# Patient Record
Sex: MALE | Race: WHITE | NOT HISPANIC OR LATINO | RURAL
[De-identification: names, ages, dates, MRNs, and addresses within clinical notes are randomized per-mention and may not be internally consistent; named-entity substitution may affect disease eponyms.]

---

## 2018-11-05 ENCOUNTER — OFFICE VISIT (OUTPATIENT)
Dept: FAMILY MEDICINE CLINIC | Facility: CLINIC | Age: 53
End: 2018-11-05

## 2018-11-05 VITALS
HEIGHT: 69 IN | TEMPERATURE: 97.8 F | SYSTOLIC BLOOD PRESSURE: 122 MMHG | WEIGHT: 229.5 LBS | BODY MASS INDEX: 33.99 KG/M2 | DIASTOLIC BLOOD PRESSURE: 80 MMHG | HEART RATE: 80 BPM | OXYGEN SATURATION: 96 %

## 2018-11-05 DIAGNOSIS — I10 ESSENTIAL HYPERTENSION: ICD-10-CM

## 2018-11-05 DIAGNOSIS — J01.00 ACUTE NON-RECURRENT MAXILLARY SINUSITIS: Primary | ICD-10-CM

## 2018-11-05 PROCEDURE — 99214 OFFICE O/P EST MOD 30 MIN: CPT | Performed by: FAMILY MEDICINE

## 2018-11-05 RX ORDER — LISINOPRIL 10 MG/1
10 TABLET ORAL DAILY
COMMUNITY
End: 2019-12-11

## 2018-11-05 RX ORDER — CEFUROXIME AXETIL 500 MG/1
500 TABLET ORAL 2 TIMES DAILY
Qty: 20 TABLET | Refills: 0 | Status: SHIPPED | OUTPATIENT
Start: 2018-11-05 | End: 2019-12-11

## 2018-11-05 RX ORDER — CEFUROXIME AXETIL 500 MG/1
500 TABLET ORAL 2 TIMES DAILY
COMMUNITY
End: 2018-11-05 | Stop reason: SDUPTHER

## 2018-11-05 RX ORDER — PROMETHAZINE HYDROCHLORIDE AND CODEINE PHOSPHATE 6.25; 1 MG/5ML; MG/5ML
5 SOLUTION ORAL EVERY 4 HOURS PRN
Qty: 240 ML | Refills: 1 | Status: SHIPPED | OUTPATIENT
Start: 2018-11-05 | End: 2019-12-11

## 2018-11-05 RX ORDER — METOPROLOL TARTRATE 50 MG/1
50 TABLET, FILM COATED ORAL DAILY
COMMUNITY
End: 2018-11-20 | Stop reason: SDUPTHER

## 2018-11-05 NOTE — PROGRESS NOTES
Subjective   Jeremy Lopez is a 53 y.o. male here today for sinus pain & pressure and nasal drainage.     Sinus Problem   This is a new problem. The current episode started in the past 7 days. The problem has been waxing and waning since onset. There has been no fever. His pain is at a severity of 6/10. The pain is moderate. Associated symptoms include congestion, coughing, headaches, shortness of breath, sinus pressure, sneezing and a sore throat. Pertinent negatives include no chills, ear pain or neck pain. Past treatments include acetaminophen and antibiotics. The treatment provided mild relief.    Had some Ceftin left from August Rx - began those 2 days ago.    The following portions of the patient's history were reviewed and updated as appropriate: history reviewed: Social history , Past Medical History, Allergies, Current Medications, Active Problem List and Health Maintenance.    Review of Systems   Constitutional: Negative for chills, fatigue and fever.   HENT: Positive for congestion, rhinorrhea, sinus pressure, sneezing and sore throat. Negative for ear pain and trouble swallowing.    Respiratory: Positive for cough and shortness of breath.    Cardiovascular: Negative for chest pain.   Musculoskeletal: Negative for neck pain.   Skin: Negative for rash.   Neurological: Positive for headache. Negative for syncope.       Objective   Physical Exam   Constitutional: He is oriented to person, place, and time. He appears well-developed and well-nourished.   HENT:   Head: Normocephalic and atraumatic.   Nose: Mucosal edema, rhinorrhea and congestion present. Right sinus exhibits maxillary sinus tenderness. Left sinus exhibits maxillary sinus tenderness.   Mouth/Throat: Oropharynx is clear and moist. No oropharyngeal exudate.   Eyes: Conjunctivae and EOM are normal.   Neck: Normal range of motion. Neck supple.   Cardiovascular: Normal rate, regular rhythm and normal heart sounds.    Pulmonary/Chest: Effort  normal. No respiratory distress. Wheezes: bliateral fine - baseline.   Abdominal: Soft. There is no tenderness.   Musculoskeletal: Normal range of motion.   Neurological: He is alert and oriented to person, place, and time.   Skin: Skin is warm and dry. Capillary refill takes less than 2 seconds.   Psychiatric: He has a normal mood and affect.   Nursing note and vitals reviewed.      Procedures    Assessment/Plan   Problem List Items Addressed This Visit     Acute non-recurrent maxillary sinusitis - Primary    Essential hypertension    Relevant Medications    lisinopril (PRINIVIL,ZESTRIL) 10 MG tablet    metoprolol tartrate (LOPRESSOR) 50 MG tablet      Continue Ceftin - Rx renewed. Continue congestion meds. Other treatments discussed.

## 2018-11-05 NOTE — PATIENT INSTRUCTIONS

## 2018-11-20 DIAGNOSIS — I10 ESSENTIAL HYPERTENSION: Primary | ICD-10-CM

## 2018-11-20 RX ORDER — METOPROLOL TARTRATE 50 MG/1
50 TABLET, FILM COATED ORAL DAILY
Qty: 30 TABLET | Refills: 5 | Status: SHIPPED | OUTPATIENT
Start: 2018-11-20 | End: 2019-05-28 | Stop reason: SDUPTHER

## 2019-01-23 RX ORDER — LISINOPRIL 40 MG/1
TABLET ORAL
Qty: 90 TABLET | Refills: 3 | Status: SHIPPED | OUTPATIENT
Start: 2019-01-23 | End: 2019-12-11

## 2019-02-06 ENCOUNTER — CLINICAL SUPPORT (OUTPATIENT)
Dept: FAMILY MEDICINE CLINIC | Facility: CLINIC | Age: 54
End: 2019-02-06

## 2019-05-28 DIAGNOSIS — I10 ESSENTIAL HYPERTENSION: ICD-10-CM

## 2019-05-28 RX ORDER — METOPROLOL SUCCINATE 50 MG/1
TABLET, EXTENDED RELEASE ORAL
Qty: 30 TABLET | Refills: 5 | Status: SHIPPED | OUTPATIENT
Start: 2019-05-28 | End: 2019-11-26 | Stop reason: SDUPTHER

## 2019-11-26 DIAGNOSIS — I10 ESSENTIAL HYPERTENSION: ICD-10-CM

## 2019-11-26 RX ORDER — METOPROLOL SUCCINATE 50 MG/1
TABLET, EXTENDED RELEASE ORAL
Qty: 30 TABLET | Refills: 0 | Status: SHIPPED | OUTPATIENT
Start: 2019-11-26 | End: 2019-12-11 | Stop reason: SDUPTHER

## 2019-12-06 ENCOUNTER — CLINICAL SUPPORT (OUTPATIENT)
Dept: FAMILY MEDICINE CLINIC | Facility: CLINIC | Age: 54
End: 2019-12-06

## 2019-12-06 DIAGNOSIS — Z00.00 ANNUAL PHYSICAL EXAM: Primary | ICD-10-CM

## 2019-12-06 DIAGNOSIS — I10 ESSENTIAL HYPERTENSION: ICD-10-CM

## 2019-12-06 DIAGNOSIS — Z12.5 SPECIAL SCREENING FOR MALIGNANT NEOPLASM OF PROSTATE: ICD-10-CM

## 2019-12-07 LAB
ALBUMIN SERPL-MCNC: 4.8 G/DL (ref 3.5–5.5)
ALBUMIN/GLOB SERPL: 1.7 {RATIO} (ref 1.2–2.2)
ALP SERPL-CCNC: 64 IU/L (ref 39–117)
ALT SERPL-CCNC: 27 IU/L (ref 0–44)
AST SERPL-CCNC: 27 IU/L (ref 0–40)
BASOPHILS # BLD AUTO: 0 X10E3/UL (ref 0–0.2)
BASOPHILS NFR BLD AUTO: 0 %
BILIRUB SERPL-MCNC: 0.3 MG/DL (ref 0–1.2)
BUN SERPL-MCNC: 11 MG/DL (ref 6–24)
BUN/CREAT SERPL: 17 (ref 9–20)
CALCIUM SERPL-MCNC: 10 MG/DL (ref 8.7–10.2)
CHLORIDE SERPL-SCNC: 98 MMOL/L (ref 96–106)
CHOLEST SERPL-MCNC: 228 MG/DL (ref 100–199)
CO2 SERPL-SCNC: 22 MMOL/L (ref 20–29)
CREAT SERPL-MCNC: 0.63 MG/DL (ref 0.76–1.27)
EOSINOPHIL # BLD AUTO: 0.2 X10E3/UL (ref 0–0.4)
EOSINOPHIL NFR BLD AUTO: 2 %
ERYTHROCYTE [DISTWIDTH] IN BLOOD BY AUTOMATED COUNT: 12.7 % (ref 12.3–15.4)
GLOBULIN SER CALC-MCNC: 2.9 G/DL (ref 1.5–4.5)
GLUCOSE SERPL-MCNC: 114 MG/DL (ref 65–99)
HCT VFR BLD AUTO: 46.5 % (ref 37.5–51)
HDLC SERPL-MCNC: 46 MG/DL
HGB BLD-MCNC: 15.6 G/DL (ref 13–17.7)
IMM GRANULOCYTES # BLD AUTO: 0 X10E3/UL (ref 0–0.1)
IMM GRANULOCYTES NFR BLD AUTO: 0 %
LDLC SERPL CALC-MCNC: 146 MG/DL (ref 0–99)
LDLC/HDLC SERPL: 3.2 RATIO (ref 0–3.6)
LYMPHOCYTES # BLD AUTO: 2.4 X10E3/UL (ref 0.7–3.1)
LYMPHOCYTES NFR BLD AUTO: 32 %
MCH RBC QN AUTO: 33 PG (ref 26.6–33)
MCHC RBC AUTO-ENTMCNC: 33.5 G/DL (ref 31.5–35.7)
MCV RBC AUTO: 98 FL (ref 79–97)
MONOCYTES # BLD AUTO: 0.5 X10E3/UL (ref 0.1–0.9)
MONOCYTES NFR BLD AUTO: 7 %
NEUTROPHILS # BLD AUTO: 4.4 X10E3/UL (ref 1.4–7)
NEUTROPHILS NFR BLD AUTO: 59 %
PLATELET # BLD AUTO: 298 X10E3/UL (ref 150–450)
POTASSIUM SERPL-SCNC: 5 MMOL/L (ref 3.5–5.2)
PROT SERPL-MCNC: 7.7 G/DL (ref 6–8.5)
PSA SERPL-MCNC: 0.5 NG/ML (ref 0–4)
RBC # BLD AUTO: 4.73 X10E6/UL (ref 4.14–5.8)
SODIUM SERPL-SCNC: 140 MMOL/L (ref 134–144)
T4 SERPL-MCNC: 7.8 UG/DL (ref 4.5–12)
TRIGL SERPL-MCNC: 178 MG/DL (ref 0–149)
TSH SERPL DL<=0.005 MIU/L-ACNC: 1.51 UIU/ML (ref 0.45–4.5)
VLDLC SERPL CALC-MCNC: 36 MG/DL (ref 5–40)
WBC # BLD AUTO: 7.6 X10E3/UL (ref 3.4–10.8)

## 2019-12-11 ENCOUNTER — OFFICE VISIT (OUTPATIENT)
Dept: FAMILY MEDICINE CLINIC | Facility: CLINIC | Age: 54
End: 2019-12-11

## 2019-12-11 VITALS
OXYGEN SATURATION: 98 % | HEART RATE: 98 BPM | WEIGHT: 230 LBS | HEIGHT: 69 IN | TEMPERATURE: 98.2 F | BODY MASS INDEX: 34.07 KG/M2 | DIASTOLIC BLOOD PRESSURE: 74 MMHG | SYSTOLIC BLOOD PRESSURE: 118 MMHG | RESPIRATION RATE: 20 BRPM

## 2019-12-11 DIAGNOSIS — Z00.00 ANNUAL PHYSICAL EXAM: Primary | ICD-10-CM

## 2019-12-11 DIAGNOSIS — J43.1 PANLOBULAR EMPHYSEMA (HCC): ICD-10-CM

## 2019-12-11 DIAGNOSIS — E78.2 MIXED HYPERLIPIDEMIA: ICD-10-CM

## 2019-12-11 DIAGNOSIS — I10 ESSENTIAL HYPERTENSION: ICD-10-CM

## 2019-12-11 PROCEDURE — 99396 PREV VISIT EST AGE 40-64: CPT | Performed by: FAMILY MEDICINE

## 2019-12-11 RX ORDER — LISINOPRIL 10 MG/1
10 TABLET ORAL DAILY
Qty: 90 TABLET | Refills: 3 | Status: SHIPPED | OUTPATIENT
Start: 2019-12-11 | End: 2020-03-11 | Stop reason: DRUGHIGH

## 2019-12-11 RX ORDER — METOPROLOL SUCCINATE 50 MG/1
50 TABLET, EXTENDED RELEASE ORAL DAILY
Qty: 90 TABLET | Refills: 3 | Status: SHIPPED | OUTPATIENT
Start: 2019-12-11 | End: 2020-12-22 | Stop reason: SDUPTHER

## 2019-12-11 RX ORDER — LISINOPRIL 40 MG/1
40 TABLET ORAL DAILY
Qty: 90 TABLET | Refills: 3 | Status: SHIPPED | OUTPATIENT
Start: 2019-12-11 | End: 2020-12-22 | Stop reason: SDUPTHER

## 2019-12-11 NOTE — PATIENT INSTRUCTIONS
Cholesterol  Cholesterol is a white, waxy, fat-like substance that is needed by the human body in small amounts. The liver makes all the cholesterol we need. Cholesterol is carried from the liver by the blood through the blood vessels. Deposits of cholesterol (plaques) may build up on blood vessel (artery) walls. Plaques make the arteries narrower and stiffer. Cholesterol plaques increase the risk for heart attack and stroke.  You cannot feel your cholesterol level even if it is very high. The only way to know that it is high is to have a blood test. Once you know your cholesterol levels, you should keep a record of the test results. Work with your health care provider to keep your levels in the desired range.  What do the results mean?  · Total cholesterol is a rough measure of all the cholesterol in your blood.  · LDL (low-density lipoprotein) is the “bad” cholesterol. This is the type that causes plaque to build up on the artery walls. You want this level to be low.  · HDL (high-density lipoprotein) is the “good” cholesterol because it cleans the arteries and carries the LDL away. You want this level to be high.  · Triglycerides are fat that the body can either burn for energy or store. High levels are closely linked to heart disease.  What are the desired levels of cholesterol?  · Total cholesterol below 200.  · LDL below 100 for people who are at risk, below 70 for people at very high risk.  · HDL above 40 is good. A level of 60 or higher is considered to be protective against heart disease.  · Triglycerides below 150.  How can I lower my cholesterol?  Diet  Follow your diet program as told by your health care provider.  · Choose fish or white meat chicken and turkey, roasted or baked. Limit fatty cuts of red meat, fried foods, and processed meats, such as sausage and lunch meats.  · Eat lots of fresh fruits and vegetables.  · Choose whole grains, beans, pasta, potatoes, and cereals.  · Choose olive oil, corn  oil, or canola oil, and use only small amounts.  · Avoid butter, mayonnaise, shortening, or palm kernel oils.  · Avoid foods with trans fats.  · Drink skim or nonfat milk and eat low-fat or nonfat yogurt and cheeses. Avoid whole milk, cream, ice cream, egg yolks, and full-fat cheeses.  · Healthier desserts include bekah food cake, roxann snaps, animal crackers, hard candy, popsicles, and low-fat or nonfat frozen yogurt. Avoid pastries, cakes, pies, and cookies.    Exercise  · Follow your exercise program as told by your health care provider. A regular program:  ? Helps to decrease LDL and raise HDL.  ? Helps with weight control.  · Do things that increase your activity level, such as gardening, walking, and taking the stairs.  · Ask your health care provider about ways that you can be more active in your daily life.  Medicine  · Take over-the-counter and prescription medicines only as told by your health care provider.  ? Medicine may be prescribed by your health care provider to help lower cholesterol and decrease the risk for heart disease. This is usually done if diet and exercise have failed to bring down cholesterol levels.  ? If you have several risk factors, you may need medicine even if your levels are normal.  This information is not intended to replace advice given to you by your health care provider. Make sure you discuss any questions you have with your health care provider.  Document Released: 09/12/2002 Document Revised: 07/15/2017 Document Reviewed: 06/17/2017  [employer] Interactive Patient Education © 2019 [employer]    Suspect Essential HTN.Good BP control is encouraged with Goal BP based on JNC 8 guidelines 2014 <140/90 for patients with known cardiac disease and diabetes. (SHERIN. 2014:322 (5):507-520. doi:10.1001/sherin.2013.87963): general population <60 yr old goal BP <140/90 and for those >60 <150/90.  For patients of all ages with Diabetes, CKD, Known CAD <140/90. Recommended to the patient to  obtain electronic home BP machine with upper arm blood pressure cuff and to check regularly as instructed.  Keep BP log and bring to subsequent visits. Stable, at goal.  a. LABS: routine for hypertension recommended and ordered if necessary.  b. Recommend if you do not have a home BP machine to obtain an electronic machine with arm blood pressure cuff.      c. Monitor BP over the next week and keep log to bring back to office. Discussed medication therapy however pt wants to try to control with diet exercise. .  Your provider  has recommended self-monitoring of your blood pressure.  If you do not have a blood pressure cuff you may purchase one from the local pharmacy.  You may ask the pharmacist which brand and model they recommend.  Obtain your blood pressure measurement at least 2x per week.  You should also check your blood pressure if you experience any symptoms of blurred visit, dizziness or headache.  Please record all blood pressure measurements and bring them to next office visit.  If you have any questions about the accuracy of your blood pressure machine please bring it in to the office and our staff will be happy to check accuracy.   d. Encouraged to eat a low sodium heart healthy diet  e. Offered handout on HTN educational topics.  These were provided if patient requested these today.  f. MEDS: as listed in today's visit.  g. Risks/benefits of current and new medications discussed with the patient and or family today.  The patient/family are aware and accept that if there any side effects they should call or return to clinic as soon as possible.  Appropriate F/U discussed for topics addressed today. All questions were answered to the  satisfactory state of patient/family.  Should symptoms fail to improve or worsen they agree to call or return to clinic or to go to the ER. Education handouts were offered on any new Rx if requested.  Discussed the importance of following up with any needed screening  tests/labs/specialist appointments and any requested follow-up recommended by me today.  Importance of maintaining follow-up discussed and patient accepts that missed appointments can delay diagnosis and potentially lead to worsening of conditions.    TAKE FLAXSEED OIL OR CAPSULES 2 PER DAY, AND RED RICE YEAST 2 PER DAY ALONG WITH THE FISH OIL FOR CHOLESTEROL.   Calorie Counting for Weight Loss  Calories are units of energy. Your body needs a certain amount of calories from food to keep you going throughout the day. When you eat more calories than your body needs, your body stores the extra calories as fat. When you eat fewer calories than your body needs, your body burns fat to get the energy it needs.  Calorie counting means keeping track of how many calories you eat and drink each day. Calorie counting can be helpful if you need to lose weight. If you make sure to eat fewer calories than your body needs, you should lose weight. Ask your health care provider what a healthy weight is for you.  For calorie counting to work, you will need to eat the right number of calories in a day in order to lose a healthy amount of weight per week. A dietitian can help you determine how many calories you need in a day and will give you suggestions on how to reach your calorie goal.  · A healthy amount of weight to lose per week is usually 1-2 lb (0.5-0.9 kg). This usually means that your daily calorie intake should be reduced by 500-750 calories.  · Eating 1,200 - 1,500 calories per day can help most women lose weight.  · Eating 1,500 - 1,800 calories per day can help most men lose weight.  What is my plan?  My goal is to have __________ calories per day.  If I have this many calories per day, I should lose around __________ pounds per week.  What do I need to know about calorie counting?  In order to meet your daily calorie goal, you will need to:  · Find out how many calories are in each food you would like to eat. Try to do  this before you eat.  · Decide how much of the food you plan to eat.  · Write down what you ate and how many calories it had. Doing this is called keeping a food log.  To successfully lose weight, it is important to balance calorie counting with a healthy lifestyle that includes regular activity. Aim for 150 minutes of moderate exercise (such as walking) or 75 minutes of vigorous exercise (such as running) each week.  Where do I find calorie information?    The number of calories in a food can be found on a Nutrition Facts label. If a food does not have a Nutrition Facts label, try to look up the calories online or ask your dietitian for help.  Remember that calories are listed per serving. If you choose to have more than one serving of a food, you will have to multiply the calories per serving by the amount of servings you plan to eat. For example, the label on a package of bread might say that a serving size is 1 slice and that there are 90 calories in a serving. If you eat 1 slice, you will have eaten 90 calories. If you eat 2 slices, you will have eaten 180 calories.  How do I keep a food log?  Immediately after each meal, record the following information in your food log:  · What you ate. Don't forget to include toppings, sauces, and other extras on the food.  · How much you ate. This can be measured in cups, ounces, or number of items.  · How many calories each food and drink had.  · The total number of calories in the meal.  Keep your food log near you, such as in a small notebook in your pocket, or use a mobile shailesh or website. Some programs will calculate calories for you and show you how many calories you have left for the day to meet your goal.  What are some calorie counting tips?    · Use your calories on foods and drinks that will fill you up and not leave you hungry:  ? Some examples of foods that fill you up are nuts and nut butters, vegetables, lean proteins, and high-fiber foods like whole grains.  "High-fiber foods are foods with more than 5 g fiber per serving.  ? Drinks such as sodas, specialty coffee drinks, alcohol, and juices have a lot of calories, yet do not fill you up.  · Eat nutritious foods and avoid empty calories. Empty calories are calories you get from foods or beverages that do not have many vitamins or protein, such as candy, sweets, and soda. It is better to have a nutritious high-calorie food (such as an avocado) than a food with few nutrients (such as a bag of chips).  · Know how many calories are in the foods you eat most often. This will help you calculate calorie counts faster.  · Pay attention to calories in drinks. Low-calorie drinks include water and unsweetened drinks.  · Pay attention to nutrition labels for \"low fat\" or \"fat free\" foods. These foods sometimes have the same amount of calories or more calories than the full fat versions. They also often have added sugar, starch, or salt, to make up for flavor that was removed with the fat.  · Find a way of tracking calories that works for you. Get creative. Try different apps or programs if writing down calories does not work for you.  What are some portion control tips?  · Know how many calories are in a serving. This will help you know how many servings of a certain food you can have.  · Use a measuring cup to measure serving sizes. You could also try weighing out portions on a kitchen scale. With time, you will be able to estimate serving sizes for some foods.  · Take some time to put servings of different foods on your favorite plates, bowls, and cups so you know what a serving looks like.  · Try not to eat straight from a bag or box. Doing this can lead to overeating. Put the amount you would like to eat in a cup or on a plate to make sure you are eating the right portion.  · Use smaller plates, glasses, and bowls to prevent overeating.  · Try not to multitask (for example, watch TV or use your computer) while eating. If it is " "time to eat, sit down at a table and enjoy your food. This will help you to know when you are full. It will also help you to be aware of what you are eating and how much you are eating.  What are tips for following this plan?  Reading food labels  · Check the calorie count compared to the serving size. The serving size may be smaller than what you are used to eating.  · Check the source of the calories. Make sure the food you are eating is high in vitamins and protein and low in saturated and trans fats.  Shopping  · Read nutrition labels while you shop. This will help you make healthy decisions before you decide to purchase your food.  · Make a grocery list and stick to it.  Cooking  · Try to cook your favorite foods in a healthier way. For example, try baking instead of frying.  · Use low-fat dairy products.  Meal planning  · Use more fruits and vegetables. Half of your plate should be fruits and vegetables.  · Include lean proteins like poultry and fish.  How do I count calories when eating out?  · Ask for smaller portion sizes.  · Consider sharing an entree and sides instead of getting your own entree.  · If you get your own entree, eat only half. Ask for a box at the beginning of your meal and put the rest of your entree in it so you are not tempted to eat it.  · If calories are listed on the menu, choose the lower calorie options.  · Choose dishes that include vegetables, fruits, whole grains, low-fat dairy products, and lean protein.  · Choose items that are boiled, broiled, grilled, or steamed. Stay away from items that are buttered, battered, fried, or served with cream sauce. Items labeled \"crispy\" are usually fried, unless stated otherwise.  · Choose water, low-fat milk, unsweetened iced tea, or other drinks without added sugar. If you want an alcoholic beverage, choose a lower calorie option such as a glass of wine or light beer.  · Ask for dressings, sauces, and syrups on the side. These are usually " high in calories, so you should limit the amount you eat.  · If you want a salad, choose a garden salad and ask for grilled meats. Avoid extra toppings like mccollum, cheese, or fried items. Ask for the dressing on the side, or ask for olive oil and vinegar or lemon to use as dressing.  · Estimate how many servings of a food you are given. For example, a serving of cooked rice is ½ cup or about the size of half a baseball. Knowing serving sizes will help you be aware of how much food you are eating at restaurants. The list below tells you how big or small some common portion sizes are based on everyday objects:  ? 1 oz--4 stacked dice.  ? 3 oz--1 deck of cards.  ? 1 tsp--1 die.  ? 1 Tbsp--½ a ping-pong ball.  ? 2 Tbsp--1 ping-pong ball.  ? ½ cup--½ baseball.  ? 1 cup--1 baseball.  Summary  · Calorie counting means keeping track of how many calories you eat and drink each day. If you eat fewer calories than your body needs, you should lose weight.  · A healthy amount of weight to lose per week is usually 1-2 lb (0.5-0.9 kg). This usually means reducing your daily calorie intake by 500-750 calories.  · The number of calories in a food can be found on a Nutrition Facts label. If a food does not have a Nutrition Facts label, try to look up the calories online or ask your dietitian for help.  · Use your calories on foods and drinks that will fill you up, and not on foods and drinks that will leave you hungry.  · Use smaller plates, glasses, and bowls to prevent overeating.  This information is not intended to replace advice given to you by your health care provider. Make sure you discuss any questions you have with your health care provider.  Document Released: 12/18/2006 Document Revised: 09/06/2019 Document Reviewed: 11/17/2017  [employer] Interactive Patient Education © 2019 [employer]

## 2019-12-11 NOTE — PROGRESS NOTES
OFFICE VISIT NOTE:    Jeremy Lopez is a 54 y.o. male who presents today for Follow-up ([facility]).     Still having some right hip pain and lateral hip numbness - ?lumbar spine versus hip origin - may need MRI later if persists. Tumeric didn't help - and statins made this worse - along with other muscles.   With the lisinopril, his BP is better, but he makes more mucus lately - he is on the 50mg total per day.     Hyperlipidemia   This is a chronic problem. The current episode started more than 1 year ago. The problem is controlled. Associated symptoms include shortness of breath. Pertinent negatives include no chest pain or myalgias. Current antihyperlipidemic treatment includes diet change and exercise. The current treatment provides moderate improvement of lipids. There are no compliance problems.  Risk factors for coronary artery disease include dyslipidemia, family history, male sex, hypertension and stress.        Past medical/surgical history, Family history, Social history, Allergies and Medications have been reviewed with the patient today and are updated in [facility] EMR. See below.    Past Medical History:   Diagnosis Date   • Allergic    • Hyperlipidemia      History reviewed. No pertinent surgical history.  Family History   Problem Relation Age of Onset   • Hypertension Mother      Social History     Tobacco Use   • Smoking status: Current Every Day Smoker     Types: Cigarettes   • Smokeless tobacco: Never Used   Substance Use Topics   • Alcohol use: Yes     Alcohol/week: 4.0 standard drinks     Types: 4 Glasses of wine per week   • Drug use: No       ALLERGIES:  Patient has no known allergies.    CURRENT MEDS:    Current Outpatient Medications:   •  lisinopril (PRINIVIL,ZESTRIL) 10 MG tablet, Take 1 tablet by mouth Daily., Disp: 90 tablet, Rfl: 3  •  lisinopril (PRINIVIL,ZESTRIL) 40 MG tablet, Take 1 tablet by mouth Daily., Disp: 90 tablet, Rfl: 3  •  metoprolol succinate XL (TOPROL-XL) 50 MG 24 hr  "tablet, Take 1 tablet by mouth Daily., Disp: 90 tablet, Rfl: 3    REVIEW OF SYSTEMS:  Review of Systems   Constitutional: Negative for activity change, appetite change, fatigue, fever, unexpected weight gain and unexpected weight loss.   Respiratory: Positive for shortness of breath.    Cardiovascular: Negative for chest pain.   Gastrointestinal: Negative for abdominal pain.   Genitourinary: Negative for difficulty urinating.   Musculoskeletal: Negative for myalgias.   Skin: Negative for rash.   Neurological: Negative for syncope and headache.       PHYSICAL EXAMINATION:  Vital Signs:  /74 (BP Location: Left arm, Patient Position: Sitting)   Pulse 98   Temp 98.2 °F (36.8 °C)   Resp 20   Ht 175.3 cm (69\")   Wt 104 kg (230 lb)   SpO2 98%   BMI 33.97 kg/m²   Physical Exam   Constitutional: He is oriented to person, place, and time. He appears well-developed and well-nourished. No distress.   HENT:   Head: Normocephalic and atraumatic.   Mouth/Throat: Oropharynx is clear and moist.   Neck: Normal range of motion. Neck supple. No JVD present.   Cardiovascular: Normal rate, regular rhythm, normal heart sounds and intact distal pulses.   Pulmonary/Chest: Effort normal and breath sounds normal. No respiratory distress.   Abdominal: Soft. He exhibits no distension. There is no tenderness.   Genitourinary:   Genitourinary Comments: Declines   Musculoskeletal: Normal range of motion. He exhibits no edema.   Neurological: He is alert and oriented to person, place, and time. No cranial nerve deficit.   Skin: Skin is warm and dry. Capillary refill takes less than 2 seconds. No rash noted.   Psychiatric: He has a normal mood and affect. His behavior is normal.   Nursing note and vitals reviewed.      Procedures    ASSESSMENT/ PLAN:  Problem List Items Addressed This Visit        Cardiovascular and Mediastinum    Essential hypertension    Relevant Medications    lisinopril (PRINIVIL,ZESTRIL) 10 MG tablet    lisinopril " (PRINIVIL,ZESTRIL) 40 MG tablet    metoprolol succinate XL (TOPROL-XL) 50 MG 24 hr tablet    Mixed hyperlipidemia       Respiratory    Panlobular emphysema (CMS/HCC)      Other Visit Diagnoses     Annual physical exam    -  Primary            Specific Patient Instructions:  MEDICATION Instructions: Encouraged patient to continue routine medicines as prescribed and maintain compliance. Patient instructed to report any adverse side effects or reactions to medicines promptly to the office. Patient instructed to make us aware of any OTC or herbal meds or supplement use.  DIET Recommendations: Patient instructed and provided information on the following nutrition and DIET(s): Calorie restriction for weight reduction and maintenance. Necessity for adequate daily intake of fluids/water.  EXERCISE Instructions: Discussed with patient the need for routine aerobic activity for cardiovascular fitness, 3 times a week for about 30 minutes. Daily exercise for increased fitness and weight reduction goals.    Patient's Body mass index is 33.97 kg/m². BMI is above normal parameters. Recommendations include: exercise counseling and nutrition counseling.      SMOKING Recommendations: Counseled patient and encouraged them on smoking cessation. Discussed the benefits to all body systems with smoking cessation, including decreased cardiac/lung/stroke/cancer risk.  HEALTH MAINTENANCE:  Counseling provided to patient/family about routine health maintenance and ANNUAL physicals/labs. Counseling on recommended Vaccinations appropriate for age needed.  MISCELLANEOUS Instructions: N/A      Medications or Orders placed this visit:  No orders of the defined types were placed in this encounter.      Medications DISCONTINUED this visit:  Medications Discontinued During This Encounter   Medication Reason   • cefuroxime (CEFTIN) 500 MG tablet *Therapy completed   • promethazine-codeine (PHENERGAN with CODEINE) 6.25-10 MG/5ML solution *Therapy  completed   • lisinopril (PRINIVIL,ZESTRIL) 10 MG tablet Historical Med - Therapy completed   • lisinopril (PRINIVIL,ZESTRIL) 40 MG tablet Historical Med - Therapy completed   • metoprolol succinate XL (TOPROL-XL) 50 MG 24 hr tablet Reorder       FOLLOW-UP:  Return in about 6 months (around 6/11/2020) for Recheck.    I discussed the patients findings and my recommendations with patient.  An After Visit Summary (AVS) was printed and given to the patient at discharge.      Sanya Arnold MD, FAAFP  12/11/2019

## 2019-12-17 ENCOUNTER — TELEPHONE (OUTPATIENT)
Dept: FAMILY MEDICINE CLINIC | Facility: CLINIC | Age: 54
End: 2019-12-17

## 2019-12-17 NOTE — TELEPHONE ENCOUNTER
Patient called and stated that he does not need to be on the 40mg plus the 10mg of Lisinopril and that he's only been taking the 40mg and he actually discussed lowering the dosage for this and is confused.  Please advise dosage for the lisinopril?

## 2019-12-27 DIAGNOSIS — R05.9 COUGH: Primary | ICD-10-CM

## 2020-03-10 ENCOUNTER — TELEPHONE (OUTPATIENT)
Dept: FAMILY MEDICINE CLINIC | Facility: CLINIC | Age: 55
End: 2020-03-10

## 2020-03-10 NOTE — TELEPHONE ENCOUNTER
chantix starter pack sent. Patient may start asap using instructions. Needs to schedule a follow up to discuss and to continue medication.

## 2020-03-10 NOTE — TELEPHONE ENCOUNTER
Patient called in to request CHANTIX be sent to pharmacy.     Pharmacy confirmed      Please Advise

## 2020-03-11 ENCOUNTER — OFFICE VISIT (OUTPATIENT)
Dept: FAMILY MEDICINE CLINIC | Facility: CLINIC | Age: 55
End: 2020-03-11

## 2020-03-11 VITALS
OXYGEN SATURATION: 95 % | SYSTOLIC BLOOD PRESSURE: 128 MMHG | HEIGHT: 69 IN | TEMPERATURE: 98 F | DIASTOLIC BLOOD PRESSURE: 70 MMHG | HEART RATE: 100 BPM | WEIGHT: 233.4 LBS | BODY MASS INDEX: 34.57 KG/M2

## 2020-03-11 DIAGNOSIS — F17.200 NICOTINE DEPENDENCE WITH CURRENT USE: Primary | ICD-10-CM

## 2020-03-11 PROCEDURE — 99213 OFFICE O/P EST LOW 20 MIN: CPT | Performed by: FAMILY MEDICINE

## 2020-03-11 RX ORDER — VARENICLINE TARTRATE 1 MG/1
1 TABLET, FILM COATED ORAL 2 TIMES DAILY
Qty: 60 TABLET | Refills: 4 | Status: SHIPPED | OUTPATIENT
Start: 2020-03-11 | End: 2021-01-15

## 2020-03-11 NOTE — PROGRESS NOTES
OFFICE VISIT NOTE:    Jeremy Lopez is a 54 y.o. male who presents today for Nicotine Dependence.     He declines Pneumonia vaccine also.   Had Chantix before for a few months, and did well and tolerated it well, and quit for about 3 months.     Nicotine Dependence   Presents for follow-up visit. Symptoms are negative for fatigue. His urge triggers include company of smokers. The symptoms have been stable. His first smoke is from 6 to 8 AM. He smokes 2 packs of cigarettes per day. Compliance with prior treatments has been good.        Past medical/surgical history, Family history, Social history, Allergies and Medications have been reviewed with the patient today and are updated in [facility] EMR. See below.    Past Medical History:   Diagnosis Date   • Allergic    • Hyperlipidemia      History reviewed. No pertinent surgical history.  Family History   Problem Relation Age of Onset   • Hypertension Mother      Social History     Tobacco Use   • Smoking status: Current Every Day Smoker     Packs/day: 2.00     Years: 40.00     Pack years: 80.00     Types: Cigarettes   • Smokeless tobacco: Never Used   Substance Use Topics   • Alcohol use: Yes     Alcohol/week: 4.0 standard drinks     Types: 4 Glasses of wine per week     Frequency: 2-4 times a month     Drinks per session: 1 or 2     Binge frequency: Never   • Drug use: No       ALLERGIES:  Patient has no known allergies.    CURRENT MEDS:    Current Outpatient Medications:   •  lisinopril (PRINIVIL,ZESTRIL) 40 MG tablet, Take 1 tablet by mouth Daily., Disp: 90 tablet, Rfl: 3  •  metoprolol succinate XL (TOPROL-XL) 50 MG 24 hr tablet, Take 1 tablet by mouth Daily., Disp: 90 tablet, Rfl: 3  •  varenicline (CHANTIX ROBBY) 0.5 MG X 11 & 1 MG X 42 tablet, Take 0.5 mg one daily on days 1-3 and and 0.5 mg twice daily on days 4-7.Then 1 mg twice daily for a total of 12 weeks., Disp: 53 tablet, Rfl: 0  •  varenicline (CHANTIX) 1 MG tablet, Take 1 tablet by mouth 2 (Two) Times a  "Day., Disp: 60 tablet, Rfl: 4    REVIEW OF SYSTEMS:  Review of Systems   Constitutional: Negative for activity change, appetite change, fatigue, fever, unexpected weight gain and unexpected weight loss.   Respiratory: Negative for shortness of breath.    Cardiovascular: Negative for chest pain.   Gastrointestinal: Negative for abdominal pain.   Genitourinary: Negative for difficulty urinating.   Skin: Negative for rash.   Neurological: Negative for syncope and headache.       PHYSICAL EXAMINATION:  Vital Signs:  /70 (BP Location: Left arm, Patient Position: Sitting, Cuff Size: Adult)   Pulse 100   Temp 98 °F (36.7 °C) (Temporal)   Ht 175.3 cm (69\")   Wt 106 kg (233 lb 6.4 oz)   SpO2 95%   BMI 34.47 kg/m²   Vitals:    03/11/20 1114   Patient Position: Sitting       Physical Exam   Constitutional: He is oriented to person, place, and time. He appears well-developed and well-nourished. No distress.   HENT:   Head: Normocephalic and atraumatic.   Mouth/Throat: Oropharynx is clear and moist.   Neck: Normal range of motion. Neck supple. No JVD present.   Cardiovascular: Normal rate, regular rhythm, normal heart sounds and intact distal pulses.   Pulmonary/Chest: Effort normal. No respiratory distress. He has wheezes.   Abdominal: Soft. He exhibits no distension. There is no tenderness.   Musculoskeletal: Normal range of motion. He exhibits no edema.   Neurological: He is alert and oriented to person, place, and time. No cranial nerve deficit.   Skin: Skin is warm and dry. Capillary refill takes less than 2 seconds. No rash noted.   Psychiatric: He has a normal mood and affect. His behavior is normal.   Nursing note and vitals reviewed.      Procedures    ASSESSMENT/ PLAN:  Problem List Items Addressed This Visit     None      Visit Diagnoses     Nicotine dependence with current use    -  Primary    Relevant Medications    varenicline (CHANTIX) 1 MG tablet    varenicline (CHANTIX ROBBY) 0.5 MG X 11 & 1 MG X 42 " tablet            Specific Patient Instructions:  MEDICATION Instructions: Encouraged patient to continue routine medicines as prescribed and maintain compliance. Patient instructed to report any adverse side effects or reactions to medicines promptly to the office. Patient instructed to make us aware of any OTC or herbal meds or supplement use.    DIET Recommendations: Patient instructed and provided information on the following nutrition and diet recommendations: Calorie restriction for weight reduction and maintenance. Necessity for adequate daily intake of fluids/water. Also, diet information provided in AVS for specifics.    EXERCISE Instructions: Discussed with patient the need for routine aerobic activity for cardiovascular fitness, 3 times a week for about 30 minutes. Daily exercise for increased fitness and weight reduction goals.    SMOKING Recommendations: Counseled patient and encouraged them on smoking and tobacco cessation if applicable. Discussed the benefits to all body systems with smoking/tobacco cessation, including decreased cardiac/lung/stroke/cancer risk. Encouraged no vaping use.    HEALTH MAINTENANCE:  Counseling provided to patient/family about routine health maintenance and ANNUAL physicals/labs. Counseling on recommended Vaccinations appropriate for age needed.        Patient's Body mass index is 34.47 kg/m². BMI is above normal parameters. Recommendations include: exercise counseling and nutrition counseling.      Medications or Orders placed this visit:  No orders of the defined types were placed in this encounter.      Medications DISCONTINUED this visit:  Medications Discontinued During This Encounter   Medication Reason   • varenicline (CHANTIX STARTING MONTH ROBBY) 0.5 MG X 11 & 1 MG X 42 tablet Non-compliance   • lisinopril (PRINIVIL,ZESTRIL) 10 MG tablet Dose adjustment   • HYDROcod Polst-CPM Polst ER (TUSSIONEX PENNKINETIC ER) 10-8 MG/5ML ER suspension *Therapy completed        FOLLOW-UP:  Return in about 6 months (around 9/11/2020), or if symptoms worsen or fail to improve, for Recheck.    I discussed the patients findings and my recommendations with patient.  An After Visit Summary (AVS) was printed and given to the patient at discharge.      Sanya Arnold MD, FAAFP  3/16/2020

## 2020-03-11 NOTE — PATIENT INSTRUCTIONS
Chronic Obstructive Pulmonary Disease    Chronic obstructive pulmonary disease (COPD) is a long-term (chronic) condition that affects the lungs. COPD is a general term that can be used to describe many different lung problems that cause lung swelling (inflammation) and limit airflow, including chronic bronchitis and emphysema. If you have COPD, your lung function will probably never return to normal. In most cases, it gets worse over time. However, there are steps you can take to slow the progression of the disease and improve your quality of life.  What are the causes?  This condition may be caused by:  · Smoking. This is the most common cause.  · Certain genes passed down through families.  What increases the risk?  The following factors may make you more likely to develop this condition:  · Secondhand smoke from cigarettes, pipes, or cigars.  · Exposure to chemicals and other irritants such as fumes and dust in the work environment.  · Chronic lung conditions or infections.  What are the signs or symptoms?  Symptoms of this condition include:  · Shortness of breath, especially during physical activity.  · Chronic cough with a large amount of thick mucus. Sometimes the cough may not have any mucus (dry cough).  · Wheezing.  · Rapid breaths.  · Gray or bluish discoloration (cyanosis) of the skin, especially in your fingers, toes, or lips.  · Feeling tired (fatigue).  · Weight loss.  · Chest tightness.  · Frequent infections.  · Episodes when breathing symptoms become much worse (exacerbations).  · Swelling in the ankles, feet, or legs. This may occur in later stages of the disease.  How is this diagnosed?  This condition is diagnosed based on:  · Your medical history.  · A physical exam.  You may also have tests, including:  · Lung (pulmonary) function tests. This may include a spirometry test, which measures your ability to exhale properly.  · Chest X-ray.  · CT scan.  · Blood tests.  How is this treated?  This  condition may be treated with:  · Medicines. These may include inhaled rescue medicines to treat acute exacerbations as well as long-term, or maintenance, medicines to prevent flare-ups of COPD.  ? Bronchodilators help treat COPD by dilating the airways to allow increased airflow and make your breathing more comfortable.  ? Steroids can reduce airway inflammation and help prevent exacerbations.  · Smoking cessation. If you smoke, your health care provider may ask you to quit, and may also recommend therapy or replacement products to help you quit.  · Pulmonary rehabilitation. This may involve working with a team of health care providers and specialists, such as respiratory, occupational, and physical therapists.  · Exercise and physical activity. These are beneficial for nearly all people with COPD.  · Nutrition therapy to gain weight, if you are underweight.  · Oxygen. Supplemental oxygen therapy is only helpful if you have a low oxygen level in your blood (hypoxemia).  · Lung surgery or transplant.  · Palliative care. This is to help people with COPD feel comfortable when treatment is no longer working.  Follow these instructions at home:  Medicines  · Take over-the-counter and prescription medicines (inhaled or pills) only as told by your health care provider.  · Talk to your health care provider before taking any cough or allergy medicines. You may need to avoid certain medicines that dry out your airways.  Lifestyle  · If you are a smoker, the most important thing that you can do is to stop smoking. Do not use any products that contain nicotine or tobacco, such as cigarettes and e-cigarettes. If you need help quitting, ask your health care provider. Continuing to smoke will cause the disease to progress faster.  · Avoid exposure to things that irritate your lungs, such as smoke, chemicals, and fumes.  · Stay active, but balance activity with periods of rest. Exercise and physical activity will help you maintain  your ability to do things you want to do.  · Learn and use relaxation techniques to manage stress and to control your breathing.  · Get the right amount of sleep and get quality sleep. Most adults need 7 or more hours per night.  · Eat healthy foods. Eating smaller, more frequent meals and resting before meals may help you maintain your strength.  Controlled breathing  Learn and use controlled breathing techniques as directed by your health care provider. Controlled breathing techniques include:  · Pursed lip breathing. Start by breathing in (inhaling) through your nose for 1 second. Then, purse your lips as if you were going to whistle and breathe out (exhale) through the pursed lips for 2 seconds.  · Diaphragmatic breathing. Start by putting one hand on your abdomen just above your waist. Inhale slowly through your nose. The hand on your abdomen should move out. Then purse your lips and exhale slowly. You should be able to feel the hand on your abdomen moving in as you exhale.  Controlled coughing  Learn and use controlled coughing to clear mucus from your lungs. Controlled coughing is a series of short, progressive coughs. The steps of controlled coughing are:  1. Lean your head slightly forward.  2. Breathe in deeply using diaphragmatic breathing.  3. Try to hold your breath for 3 seconds.  4. Keep your mouth slightly open while coughing twice.  5. Spit any mucus out into a tissue.  6. Rest and repeat the steps once or twice as needed.  General instructions  · Make sure you receive all the vaccines that your health care provider recommends, especially the pneumococcal and influenza vaccines. Preventing infection and hospitalization is very important when you have COPD.  · Use oxygen therapy and pulmonary rehabilitation if directed to by your health care provider. If you require home oxygen therapy, ask your health care provider whether you should purchase a pulse oximeter to measure your oxygen level at  home.  · Work with your health care provider to develop a COPD action plan. This will help you know what steps to take if your condition gets worse.  · Keep other chronic health conditions under control as told by your health care provider.  · Avoid extreme temperature and humidity changes.  · Avoid contact with people who have an illness that spreads from person to person (is contagious), such as viral infections or pneumonia.  · Keep all follow-up visits as told by your health care provider. This is important.  Contact a health care provider if:  · You are coughing up more mucus than usual.  · There is a change in the color or thickness of your mucus.  · Your breathing is more labored than usual.  · Your breathing is faster than usual.  · You have difficulty sleeping.  · You need to use your rescue medicines or inhalers more often than expected.  · You have trouble doing routine activities such as getting dressed or walking around the house.  Get help right away if:  · You have shortness of breath while you are resting.  · You have shortness of breath that prevents you from:  ? Being able to talk.  ? Performing your usual physical activities.  · You have chest pain lasting longer than 5 minutes.  · Your skin color is more blue (cyanotic) than usual.  · You measure low oxygen saturations for longer than 5 minutes with a pulse oximeter.  · You have a fever.  · You feel too tired to breathe normally.  Summary  · Chronic obstructive pulmonary disease (COPD) is a long-term (chronic) condition that affects the lungs.  · Your lung function will probably never return to normal. In most cases, it gets worse over time. However, there are steps you can take to slow the progression of the disease and improve your quality of life.  · Treatment for COPD may include taking medicines, quitting smoking, pulmonary rehabilitation, and changes to diet and exercise. As the disease progresses, you may need oxygen therapy, a lung  transplant, or palliative care.  · To help manage your condition, do not smoke, avoid exposure to things that irritate your lungs, stay up to date on all vaccines, and follow your health care provider's instructions for taking medicines.  This information is not intended to replace advice given to you by your health care provider. Make sure you discuss any questions you have with your health care provider.  Document Released: 09/27/2006 Document Revised: 06/13/2018 Document Reviewed: 01/22/2018  [employer] Interactive Patient Education © 2020 [employer]      For more information:    Quit Now [place]  1-800-QUIT-NOW  https://[facility].quitlogix.org/en-[place]/  Steps to Quit Smoking  Smoking tobacco can be harmful to your health and can affect almost every organ in your body. Smoking puts you, and those around you, at risk for developing many serious chronic diseases. Quitting smoking is difficult, but it is one of the best things that you can do for your health. It is never too late to quit.  What are the benefits of quitting smoking?  When you quit smoking, you lower your risk of developing serious diseases and conditions, such as:  · Lung cancer or lung disease, such as COPD.  · Heart disease.  · Stroke.  · Heart attack.  · Infertility.  · Osteoporosis and bone fractures.  Additionally, symptoms such as coughing, wheezing, and shortness of breath may get better when you quit. You may also find that you get sick less often because your body is stronger at fighting off colds and infections. If you are pregnant, quitting smoking can help to reduce your chances of having a baby of low birth weight.  How do I get ready to quit?  When you decide to quit smoking, create a plan to make sure that you are successful. Before you quit:  · Pick a date to quit. Set a date within the next two weeks to give you time to prepare.  · Write down the reasons why you are quitting. Keep this list in places where you will see it often, such  as on your bathroom mirror or in your car or wallet.  · Identify the people, places, things, and activities that make you want to smoke (triggers) and avoid them. Make sure to take these actions:  ¨ Throw away all cigarettes at home, at work, and in your car.  ¨ Throw away smoking accessories, such as ashtrays and lighters.  ¨ Clean your car and make sure to empty the ashtray.  ¨ Clean your home, including curtains and carpets.  · Tell your family, friends, and coworkers that you are quitting. Support from your loved ones can make quitting easier.  · Talk with your health care provider about your options for quitting smoking.  · Find out what treatment options are covered by your health insurance.  What strategies can I use to quit smoking?  Talk with your healthcare provider about different strategies to quit smoking. Some strategies include:  · Quitting smoking altogether instead of gradually lessening how much you smoke over a period of time. Research shows that quitting “cold turkey” is more successful than gradually quitting.  · Attending in-person counseling to help you build problem-solving skills. You are more likely to have success in quitting if you attend several counseling sessions. Even short sessions of 10 minutes can be effective.  · Finding resources and support systems that can help you to quit smoking and remain smoke-free after you quit. These resources are most helpful when you use them often. They can include:  ¨ Online chats with a counselor.  ¨ Telephone quitlines.  ¨ Printed self-help materials.  ¨ Support groups or group counseling.  ¨ Text messaging programs.  ¨ Mobile phone applications.  · Taking medicines to help you quit smoking. (If you are pregnant or breastfeeding, talk with your health care provider first.) Some medicines contain nicotine and some do not. Both types of medicines help with cravings, but the medicines that include nicotine help to relieve withdrawal symptoms. Your  health care provider may recommend:  ¨ Nicotine patches, gum, or lozenges.  ¨ Nicotine inhalers or sprays.  ¨ Non-nicotine medicine that is taken by mouth.  Talk with your health care provider about combining strategies, such as taking medicines while you are also receiving in-person counseling. Using these two strategies together makes you more likely to succeed in quitting than if you used either strategy on its own.  If you are pregnant or breastfeeding, talk with your health care provider about finding counseling or other support strategies to quit smoking. Do not take medicine to help you quit smoking unless told to do so by your health care provider.  What things can I do to make it easier to quit?  Quitting smoking might feel overwhelming at first, but there is a lot that you can do to make it easier. Take these important actions:  · Reach out to your family and friends and ask that they support and encourage you during this time. Call telephone quitlines, reach out to support groups, or work with a counselor for support.  · Ask people who smoke to avoid smoking around you.  · Avoid places that trigger you to smoke, such as bars, parties, or smoke-break areas at work.  · Spend time around people who do not smoke.  · Lessen stress in your life, because stress can be a smoking trigger for some people. To lessen stress, try:  ¨ Exercising regularly.  ¨ Deep-breathing exercises.  ¨ Yoga.  ¨ Meditating.  ¨ Performing a body scan. This involves closing your eyes, scanning your body from head to toe, and noticing which parts of your body are particularly tense. Purposefully relax the muscles in those areas.  · Download or purchase mobile phone or tablet apps (applications) that can help you stick to your quit plan by providing reminders, tips, and encouragement. There are many free apps, such as QuitGuide from the CDC (Centers for Disease Control and Prevention). You can find other support for quitting smoking  (smoking cessation) through smokefree.gov and other websites.  How will I feel when I quit smoking?  Within the first 24 hours of quitting smoking, you may start to feel some withdrawal symptoms. These symptoms are usually most noticeable 2-3 days after quitting, but they usually do not last beyond 2-3 weeks. Changes or symptoms that you might experience include:  · Mood swings.  · Restlessness, anxiety, or irritation.  · Difficulty concentrating.  · Dizziness.  · Strong cravings for sugary foods in addition to nicotine.  · Mild weight gain.  · Constipation.  · Nausea.  · Coughing or a sore throat.  · Changes in how your medicines work in your body.  · A depressed mood.  · Difficulty sleeping (insomnia).  After the first 2-3 weeks of quitting, you may start to notice more positive results, such as:  · Improved sense of smell and taste.  · Decreased coughing and sore throat.  · Slower heart rate.  · Lower blood pressure.  · Clearer skin.  · The ability to breathe more easily.  · Fewer sick days.  Quitting smoking is very challenging for most people. Do not get discouraged if you are not successful the first time. Some people need to make many attempts to quit before they achieve long-term success. Do your best to stick to your quit plan, and talk with your health care provider if you have any questions or concerns.  This information is not intended to replace advice given to you by your health care provider. Make sure you discuss any questions you have with your health care provider.  Document Released: 12/12/2002 Document Revised: 08/15/2017 Document Reviewed: 05/03/2016  [employer] Interactive Patient Education © 2017 [employer]

## 2020-12-19 DIAGNOSIS — I10 ESSENTIAL HYPERTENSION: ICD-10-CM

## 2020-12-21 RX ORDER — LISINOPRIL 40 MG/1
TABLET ORAL
OUTPATIENT
Start: 2020-12-21

## 2020-12-21 RX ORDER — METOPROLOL SUCCINATE 50 MG/1
TABLET, EXTENDED RELEASE ORAL
OUTPATIENT
Start: 2020-12-21

## 2020-12-22 DIAGNOSIS — I10 ESSENTIAL HYPERTENSION: ICD-10-CM

## 2020-12-22 RX ORDER — METOPROLOL SUCCINATE 50 MG/1
50 TABLET, EXTENDED RELEASE ORAL DAILY
Qty: 10 TABLET | Refills: 0 | Status: SHIPPED | OUTPATIENT
Start: 2020-12-22 | End: 2021-01-15 | Stop reason: SDUPTHER

## 2020-12-22 RX ORDER — LISINOPRIL 40 MG/1
40 TABLET ORAL DAILY
Qty: 10 TABLET | Refills: 0 | Status: SHIPPED | OUTPATIENT
Start: 2020-12-22 | End: 2021-01-15 | Stop reason: SDUPTHER

## 2020-12-22 NOTE — TELEPHONE ENCOUNTER
"Called Pt to scheduled wellness. Pt stated \"so you're telling me I can get my medicine until I pay you some money?\" Pt did not believe it had been 1 yr since last appt. Advised last wellness was 12/11/19. Pt did not want to schedule and advised that he would \"text Sanya.\"  "

## 2021-01-15 ENCOUNTER — OFFICE VISIT (OUTPATIENT)
Dept: FAMILY MEDICINE CLINIC | Facility: CLINIC | Age: 56
End: 2021-01-15

## 2021-01-15 VITALS
DIASTOLIC BLOOD PRESSURE: 80 MMHG | BODY MASS INDEX: 34.8 KG/M2 | OXYGEN SATURATION: 95 % | TEMPERATURE: 97.5 F | HEIGHT: 69 IN | WEIGHT: 235 LBS | HEART RATE: 106 BPM | SYSTOLIC BLOOD PRESSURE: 122 MMHG

## 2021-01-15 DIAGNOSIS — Z12.5 SCREENING FOR MALIGNANT NEOPLASM OF PROSTATE: ICD-10-CM

## 2021-01-15 DIAGNOSIS — H65.23 BILATERAL CHRONIC SEROUS OTITIS MEDIA: ICD-10-CM

## 2021-01-15 DIAGNOSIS — I10 ESSENTIAL HYPERTENSION: Primary | ICD-10-CM

## 2021-01-15 DIAGNOSIS — J30.2 SEASONAL ALLERGIC RHINITIS, UNSPECIFIED TRIGGER: ICD-10-CM

## 2021-01-15 DIAGNOSIS — E66.09 CLASS 1 OBESITY DUE TO EXCESS CALORIES WITH SERIOUS COMORBIDITY AND BODY MASS INDEX (BMI) OF 34.0 TO 34.9 IN ADULT: ICD-10-CM

## 2021-01-15 DIAGNOSIS — J43.1 PANLOBULAR EMPHYSEMA (HCC): ICD-10-CM

## 2021-01-15 PROCEDURE — 99214 OFFICE O/P EST MOD 30 MIN: CPT | Performed by: FAMILY MEDICINE

## 2021-01-15 RX ORDER — LEVOCETIRIZINE DIHYDROCHLORIDE 5 MG/1
5 TABLET, FILM COATED ORAL EVERY EVENING
Qty: 30 TABLET | Refills: 2 | Status: SHIPPED | OUTPATIENT
Start: 2021-01-15 | End: 2021-05-28

## 2021-01-15 RX ORDER — METOPROLOL SUCCINATE 50 MG/1
50 TABLET, EXTENDED RELEASE ORAL DAILY
Qty: 90 TABLET | Refills: 1 | Status: SHIPPED | OUTPATIENT
Start: 2021-01-15 | End: 2022-01-13

## 2021-01-15 RX ORDER — LISINOPRIL 40 MG/1
40 TABLET ORAL DAILY
Qty: 90 TABLET | Refills: 1 | Status: SHIPPED | OUTPATIENT
Start: 2021-01-15 | End: 2022-01-13

## 2021-01-15 NOTE — PROGRESS NOTES
OFFICE VISIT NOTE:    Jeremy Lopez is a 55 y.o. male who presents today for Hypertension (ember/refills).     Hypertension  This is a chronic problem. The current episode started more than 1 year ago. The problem is unchanged. The problem is uncontrolled. Associated symptoms include anxiety and shortness of breath. Pertinent negatives include no chest pain, headaches, orthopnea, palpitations, peripheral edema or PND. There are no associated agents to hypertension. The current treatment provides significant improvement. There are no compliance problems.    Hyperlipidemia  This is a chronic problem. The current episode started more than 1 year ago. The problem is uncontrolled. Associated symptoms include shortness of breath. Pertinent negatives include no chest pain. Current antihyperlipidemic treatment includes diet change and exercise. The current treatment provides moderate improvement of lipids. Compliance problems include adherence to diet and adherence to exercise.         Past medical/surgical history, Family history, Social history, Allergies and Medications have been reviewed with the patient today and are updated in [employer] EMR. See below.  Past Medical History:   Diagnosis Date   • Allergic    • Hyperlipidemia      History reviewed. No pertinent surgical history.  Family History   Problem Relation Age of Onset   • Hypertension Mother      Social History     Tobacco Use   • Smoking status: Current Every Day Smoker     Packs/day: 1.50     Years: 40.00     Pack years: 60.00     Types: Cigarettes   • Smokeless tobacco: Never Used   Substance Use Topics   • Alcohol use: Yes     Alcohol/week: 4.0 standard drinks     Types: 4 Glasses of wine per week     Frequency: 2-4 times a month     Drinks per session: 1 or 2     Binge frequency: Never     Comment: occ   • Drug use: No       ALLERGIES:  Patient has no known allergies.    CURRENT MEDS:    Current Outpatient Medications:   •  lisinopril (PRINIVIL,ZESTRIL) 40 MG  "tablet, Take 1 tablet by mouth Daily., Disp: 90 tablet, Rfl: 1  •  metoprolol succinate XL (TOPROL-XL) 50 MG 24 hr tablet, Take 1 tablet by mouth Daily., Disp: 90 tablet, Rfl: 1  •  levocetirizine (Xyzal) 5 MG tablet, Take 1 tablet by mouth Every Evening., Disp: 30 tablet, Rfl: 2    REVIEW OF SYSTEMS:  Review of Systems   Constitutional: Negative for activity change, appetite change, fatigue, fever, unexpected weight gain and unexpected weight loss.   Respiratory: Positive for shortness of breath.    Cardiovascular: Negative for chest pain, palpitations, orthopnea and PND.   Gastrointestinal: Negative for abdominal pain.   Genitourinary: Negative for difficulty urinating.   Skin: Negative for rash.   Neurological: Negative for syncope and headache.       PHYSICAL EXAMINATION:  Vital Signs:  /80 (BP Location: Left arm, Patient Position: Sitting, Cuff Size: Large Adult)   Pulse 106   Temp 97.5 °F (36.4 °C)   Ht 175.3 cm (69\") Comment: pt reported  Wt 107 kg (235 lb)   SpO2 95%   BMI 34.70 kg/m²   Vitals:    01/15/21 1134 01/15/21 1200   Patient Position: Sitting Sitting       Physical Exam  Vitals signs and nursing note reviewed.   Constitutional:       General: He is not in acute distress.     Appearance: He is well-developed.   HENT:      Head: Normocephalic and atraumatic.      Mouth/Throat:      Mouth: Mucous membranes are moist.      Pharynx: Oropharynx is clear.   Eyes:      Extraocular Movements: Extraocular movements intact.      Pupils: Pupils are equal, round, and reactive to light.   Neck:      Musculoskeletal: Normal range of motion and neck supple.      Vascular: No JVD.   Cardiovascular:      Rate and Rhythm: Normal rate and regular rhythm.      Pulses: Normal pulses.      Heart sounds: Normal heart sounds.   Pulmonary:      Effort: Pulmonary effort is normal. No respiratory distress.      Breath sounds: Rhonchi present.   Abdominal:      General: Bowel sounds are normal. There is no " distension.      Palpations: Abdomen is soft.      Tenderness: There is no abdominal tenderness.   Musculoskeletal: Normal range of motion.   Skin:     General: Skin is warm and dry.      Capillary Refill: Capillary refill takes less than 2 seconds.      Findings: No rash.   Neurological:      General: No focal deficit present.      Mental Status: He is alert and oriented to person, place, and time.      Cranial Nerves: No cranial nerve deficit.   Psychiatric:         Mood and Affect: Mood normal.         Behavior: Behavior normal.         Procedures    ASSESSMENT/ PLAN:  Problem List Items Addressed This Visit        Cardiac and Vasculature    Essential hypertension - Primary    Relevant Medications    lisinopril (PRINIVIL,ZESTRIL) 40 MG tablet    metoprolol succinate XL (TOPROL-XL) 50 MG 24 hr tablet    Other Relevant Orders    CBC and Differential    Comprehensive metabolic panel    Lipid panel    T4    TSH       Pulmonary and Pneumonias    Panlobular emphysema (CMS/HCC)    Relevant Medications    levocetirizine (Xyzal) 5 MG tablet      Other Visit Diagnoses     Seasonal allergic rhinitis, unspecified trigger        Relevant Medications    levocetirizine (Xyzal) 5 MG tablet    Bilateral chronic serous otitis media        Relevant Medications    levocetirizine (Xyzal) 5 MG tablet    Class 1 obesity due to excess calories with serious comorbidity and body mass index (BMI) of 34.0 to 34.9 in adult        Relevant Orders    T4    TSH    Screening for malignant neoplasm of prostate        Relevant Orders    PSA Screen            Specific Patient Instructions:  MEDICATION Instructions: Encouraged patient to continue routine medicines as prescribed and maintain compliance. Patient instructed to report any adverse side effects or reactions to medicines promptly to the office. Patient instructed to make us aware of any OTC or herbal meds or supplement use.    DIET Recommendations: Patient instructed and provided  information on the following nutrition and diet recommendations: Calorie restriction for weight reduction and maintenance. Necessity for adequate daily intake of fluids/water. Also, diet information provided in AVS for specifics.    EXERCISE Instructions: Discussed with patient the need for routine aerobic activity for cardiovascular fitness, 3 times a week for about 30 minutes. Daily exercise for increased fitness and weight reduction goals.    SMOKING Recommendations: Counseled patient and encouraged them on smoking and tobacco cessation if applicable. Discussed the benefits to all body systems with smoking/tobacco cessation, including decreased cardiac/lung/stroke/cancer risk. Encouraged no vaping use.    HEALTH MAINTENANCE:  Counseling provided to patient/family about routine health maintenance and ANNUAL physicals/labs. Counseling on recommended Vaccinations appropriate for age needed.        Patient's Body mass index is 34.7 kg/m². BMI is above normal parameters. Recommendations include: exercise counseling and nutrition counseling.      Medications or Orders placed this visit:  Orders Placed This Encounter   Procedures   • Comprehensive metabolic panel     Standing Status:   Future     Standing Expiration Date:   1/15/2022   • Lipid panel     Standing Status:   Future     Standing Expiration Date:   1/15/2022     Order Specific Question:   [facility] Has the patient fasted?     Answer:   Yes   • PSA Screen     Standing Status:   Future     Standing Expiration Date:   1/15/2022   • T4     Standing Status:   Future     Standing Expiration Date:   1/15/2022   • TSH     Standing Status:   Future     Standing Expiration Date:   1/15/2022   • CBC and Differential     Standing Status:   Future     Standing Expiration Date:   1/15/2022     Order Specific Question:   Manual Differential     Answer:   No       Medications DISCONTINUED this visit:  Medications Discontinued During This Encounter   Medication Reason   •  varenicline (CHANTIX ROBBY) 0.5 MG X 11 & 1 MG X 42 tablet *Therapy completed   • varenicline (CHANTIX) 1 MG tablet *Therapy completed   • lisinopril (PRINIVIL,ZESTRIL) 40 MG tablet Reorder   • metoprolol succinate XL (TOPROL-XL) 50 MG 24 hr tablet Reorder       FOLLOW-UP:  Return in about 6 months (around 7/15/2021) for Recheck, Next scheduled follow up.    I discussed the patients findings and my recommendations with patient.  An After Visit Summary (AVS) was printed and given to the patient at discharge.        Sanya Arnold MD, FAAFP  1/15/2021

## 2021-01-15 NOTE — PATIENT INSTRUCTIONS
Suspect Essential HTN.Good BP control is encouraged with Goal BP based on JNC 8 guidelines 2014 <140/90 for patients with known cardiac disease and diabetes. (BERENICE. 2014:322 (5):507-520. doi:10.1001/berenice.2013.93068): general population <60 yr old goal BP <140/90 and for those >60 <150/90.  For patients of all ages with Diabetes, CKD, Known CAD <140/90. Recommended to the patient to obtain electronic home BP machine with upper arm blood pressure cuff and to check regularly as instructed.  Keep BP log and bring to subsequent visits. Stable, at goal.  a. LABS: routine for hypertension recommended and ordered if necessary.  b. Recommend if you do not have a home BP machine to obtain an electronic machine with arm blood pressure cuff.      c. Monitor BP over the next week and keep log to bring back to office. Discussed medication therapy however pt wants to try to control with diet exercise. .  Your provider  has recommended self-monitoring of your blood pressure.  If you do not have a blood pressure cuff you may purchase one from the local pharmacy.  You may ask the pharmacist which brand and model they recommend.  Obtain your blood pressure measurement at least 2x per week.  You should also check your blood pressure if you experience any symptoms of blurred visit, dizziness or headache.  Please record all blood pressure measurements and bring them to next office visit.  If you have any questions about the accuracy of your blood pressure machine please bring it in to the office and our staff will be happy to check accuracy.   d. Encouraged to eat a low sodium heart healthy diet  e. Offered handout on HTN educational topics.  These were provided if patient requested these today.  f. MEDS: as listed in today's visit.  g. Risks/benefits of current and new medications discussed with the patient and or family today.  The patient/family are aware and accept that if there any side effects they should call or return to clinic  as soon as possible.  Appropriate F/U discussed for topics addressed today. All questions were answered to the  satisfactory state of patient/family.  Should symptoms fail to improve or worsen they agree to call or return to clinic or to go to the ER. Education handouts were offered on any new Rx if requested.  Discussed the importance of following up with any needed screening tests/labs/specialist appointments and any requested follow-up recommended by me today.  Importance of maintaining follow-up discussed and patient accepts that missed appointments can delay diagnosis and potentially lead to worsening of conditions.      Fat and Cholesterol Restricted Eating Plan  Getting too much fat and cholesterol in your diet may cause health problems. Choosing the right foods helps keep your fat and cholesterol at normal levels. This can keep you from getting certain diseases.  Your doctor may recommend an eating plan that includes:  · Total fat: ______% or less of total calories a day.  · Saturated fat: ______% or less of total calories a day.  · Cholesterol: less than _________mg a day.  · Fiber: ______g a day.  What are tips for following this plan?  Meal planning  · At meals, divide your plate into four equal parts:  ? Fill one-half of your plate with vegetables and green salads.  ? Fill one-fourth of your plate with whole grains.  ? Fill one-fourth of your plate with low-fat (lean) protein foods.  · Eat fish that is high in omega-3 fats at least two times a week. This includes mackerel, tuna, sardines, and salmon.  · Eat foods that are high in fiber, such as whole grains, beans, apples, broccoli, carrots, peas, and barley.  General tips    · Work with your doctor to lose weight if you need to.  · Avoid:  ? Foods with added sugar.  ? Fried foods.  ? Foods with partially hydrogenated oils.  · Limit alcohol intake to no more than 1 drink a day for nonpregnant women and 2 drinks a day for men. One drink equals 12 oz of  "beer, 5 oz of wine, or 1½ oz of hard liquor.  Reading food labels  · Check food labels for:  ? Trans fats.  ? Partially hydrogenated oils.  ? Saturated fat (g) in each serving.  ? Cholesterol (mg) in each serving.  ? Fiber (g) in each serving.  · Choose foods with healthy fats, such as:  ? Monounsaturated fats.  ? Polyunsaturated fats.  ? Omega-3 fats.  · Choose grain products that have whole grains. Look for the word \"whole\" as the first word in the ingredient list.  Cooking  · Cook foods using low-fat methods. These include baking, boiling, grilling, and broiling.  · Eat more home-cooked foods. Eat at restaurants and buffets less often.  · Avoid cooking using saturated fats, such as butter, cream, palm oil, palm kernel oil, and coconut oil.  Recommended foods    Fruits  · All fresh, canned (in natural juice), or frozen fruits.  Vegetables  · Fresh or frozen vegetables (raw, steamed, roasted, or grilled). Green salads.  Grains  · Whole grains, such as whole wheat or whole grain breads, crackers, cereals, and pasta. Unsweetened oatmeal, bulgur, barley, quinoa, or brown rice. Corn or whole wheat flour tortillas.  Meats and other protein foods  · Ground beef (85% or leaner), grass-fed beef, or beef trimmed of fat. Skinless chicken or turkey. Ground chicken or turkey. Pork trimmed of fat. All fish and seafood. Egg whites. Dried beans, peas, or lentils. Unsalted nuts or seeds. Unsalted canned beans. Nut butters without added sugar or oil.  Dairy  · Low-fat or nonfat dairy products, such as skim or 1% milk, 2% or reduced-fat cheeses, low-fat and fat-free ricotta or cottage cheese, or plain low-fat and nonfat yogurt.  Fats and oils  · Tub margarine without trans fats. Light or reduced-fat mayonnaise and salad dressings. Avocado. Olive, canola, sesame, or safflower oils.  The items listed above may not be a complete list of foods and beverages you can eat. Contact a dietitian for more information.  Foods to " avoid  Fruits  · Canned fruit in heavy syrup. Fruit in cream or butter sauce. Fried fruit.  Vegetables  · Vegetables cooked in cheese, cream, or butter sauce. Fried vegetables.  Grains  · White bread. White pasta. White rice. Cornbread. Bagels, pastries, and croissants. Crackers and snack foods that contain trans fat and hydrogenated oils.  Meats and other protein foods  · Fatty cuts of meat. Ribs, chicken wings, mccollum, sausage, bologna, salami, chitterlings, fatback, hot dogs, bratwurst, and packaged lunch meats. Liver and organ meats. Whole eggs and egg yolks. Chicken and turkey with skin. Fried meat.  Dairy  · Whole or 2% milk, cream, half-and-half, and cream cheese. Whole milk cheeses. Whole-fat or sweetened yogurt. Full-fat cheeses. Nondairy creamers and whipped toppings. Processed cheese, cheese spreads, and cheese curds.  Beverages  · Alcohol. Sugar-sweetened drinks such as sodas, lemonade, and fruit drinks.  Fats and oils  · Butter, stick margarine, lard, shortening, ghee, or mccollum fat. Coconut, palm kernel, and palm oils.  Sweets and desserts  · Corn syrup, sugars, honey, and molasses. Candy. Jam and jelly. Syrup. Sweetened cereals. Cookies, pies, cakes, donuts, muffins, and ice cream.  The items listed above may not be a complete list of foods and beverages you should avoid. Contact a dietitian for more information.  Summary  · Choosing the right foods helps keep your fat and cholesterol at normal levels. This can keep you from getting certain diseases.  · At meals, fill one-half of your plate with vegetables and green salads.  · Eat high-fiber foods, like whole grains, beans, apples, carrots, peas, and barley.  · Limit added sugar, saturated fats, alcohol, and fried foods.  This information is not intended to replace advice given to you by your health care provider. Make sure you discuss any questions you have with your health care provider.  Document Revised: 08/21/2019 Document Reviewed:  09/04/2018  Elsevier Patient Education © 2020 [employer]

## 2021-03-01 ENCOUNTER — TELEPHONE (OUTPATIENT)
Dept: FAMILY MEDICINE CLINIC | Facility: CLINIC | Age: 56
End: 2021-03-01

## 2021-03-01 NOTE — TELEPHONE ENCOUNTER
Patient called regarding his last visit with Dr. Arnold in January.  He is stating that it was supposed to be a wellness visit.  He was surprised and upset that he had received a bill for his visit.  I asked him if it was supposed to be a wellness, because the original appointment was not scheduled like that.  I explained to him that it was scheduled like a normal office visit.  He was frustrated that he received a bill for around $140.00, he stated that he went ahead and paid it, but he just wanted us to be aware of the issue.(and that he was mad about the issue).  I asked if he would like to speak with you about the issue and he stated he would like to speak with you about it, just to make sure you are aware of the issue that occurred.       Please call him when you get a chance.    [phone]

## 2021-03-01 NOTE — TELEPHONE ENCOUNTER
Called and spoke with patient regarding appointment. Patient was originally scheduled for a physical but did not have labs completed prior. The appointment was changed to a recheck on blood pressure and physical was rescheduled for his physical in July. I explained the reasoning of this happening to patient and apologized for the miscommunication that he received at his appointment.   Patient stated that he understood he needed labs but though that Dr. Arnold would still complete the wellness. Patient was still upset at the end of the call regarding this. I apologized to patient for any inconvenience this has caused.

## 2021-04-27 ENCOUNTER — TELEPHONE (OUTPATIENT)
Dept: FAMILY MEDICINE CLINIC | Facility: CLINIC | Age: 56
End: 2021-04-27

## 2021-04-27 NOTE — TELEPHONE ENCOUNTER
Defer to Dr. Arnold. Patient has not had labs drawn since December 2019. Ordered in January but he never came in for them.  He did have a visit in January 2021.

## 2021-05-24 ENCOUNTER — CLINICAL SUPPORT (OUTPATIENT)
Dept: FAMILY MEDICINE CLINIC | Facility: CLINIC | Age: 56
End: 2021-05-24

## 2021-05-25 LAB
ALBUMIN SERPL-MCNC: 4.4 G/DL (ref 3.8–4.9)
ALBUMIN/GLOB SERPL: 1.6 {RATIO} (ref 1.2–2.2)
ALP SERPL-CCNC: 65 IU/L (ref 48–121)
ALT SERPL-CCNC: 27 IU/L (ref 0–44)
AST SERPL-CCNC: 24 IU/L (ref 0–40)
BASOPHILS # BLD AUTO: 0 X10E3/UL (ref 0–0.2)
BASOPHILS NFR BLD AUTO: 1 %
BILIRUB SERPL-MCNC: 0.4 MG/DL (ref 0–1.2)
BUN SERPL-MCNC: 8 MG/DL (ref 6–24)
BUN/CREAT SERPL: 13 (ref 9–20)
CALCIUM SERPL-MCNC: 9.6 MG/DL (ref 8.7–10.2)
CHLORIDE SERPL-SCNC: 98 MMOL/L (ref 96–106)
CHOLEST SERPL-MCNC: 196 MG/DL (ref 100–199)
CO2 SERPL-SCNC: 26 MMOL/L (ref 20–29)
CREAT SERPL-MCNC: 0.62 MG/DL (ref 0.76–1.27)
EOSINOPHIL # BLD AUTO: 0.1 X10E3/UL (ref 0–0.4)
EOSINOPHIL NFR BLD AUTO: 2 %
ERYTHROCYTE [DISTWIDTH] IN BLOOD BY AUTOMATED COUNT: 12.1 % (ref 11.6–15.4)
GLOBULIN SER CALC-MCNC: 2.8 G/DL (ref 1.5–4.5)
GLUCOSE SERPL-MCNC: 105 MG/DL (ref 65–99)
HCT VFR BLD AUTO: 50.2 % (ref 37.5–51)
HDLC SERPL-MCNC: 44 MG/DL
HGB BLD-MCNC: 17.1 G/DL (ref 13–17.7)
IMM GRANULOCYTES # BLD AUTO: 0 X10E3/UL (ref 0–0.1)
IMM GRANULOCYTES NFR BLD AUTO: 0 %
LDLC SERPL CALC-MCNC: 131 MG/DL (ref 0–99)
LYMPHOCYTES # BLD AUTO: 1.9 X10E3/UL (ref 0.7–3.1)
LYMPHOCYTES NFR BLD AUTO: 36 %
MCH RBC QN AUTO: 32.2 PG (ref 26.6–33)
MCHC RBC AUTO-ENTMCNC: 34.1 G/DL (ref 31.5–35.7)
MCV RBC AUTO: 95 FL (ref 79–97)
MONOCYTES # BLD AUTO: 0.4 X10E3/UL (ref 0.1–0.9)
MONOCYTES NFR BLD AUTO: 8 %
NEUTROPHILS # BLD AUTO: 2.8 X10E3/UL (ref 1.4–7)
NEUTROPHILS NFR BLD AUTO: 53 %
PLATELET # BLD AUTO: 251 X10E3/UL (ref 150–450)
POTASSIUM SERPL-SCNC: 4.8 MMOL/L (ref 3.5–5.2)
PROT SERPL-MCNC: 7.2 G/DL (ref 6–8.5)
PSA SERPL-MCNC: 0.4 NG/ML (ref 0–4)
RBC # BLD AUTO: 5.31 X10E6/UL (ref 4.14–5.8)
SODIUM SERPL-SCNC: 136 MMOL/L (ref 134–144)
T4 SERPL-MCNC: 6.8 UG/DL (ref 4.5–12)
TRIGL SERPL-MCNC: 114 MG/DL (ref 0–149)
TSH SERPL DL<=0.005 MIU/L-ACNC: 1.15 UIU/ML (ref 0.45–4.5)
VLDLC SERPL CALC-MCNC: 21 MG/DL (ref 5–40)
WBC # BLD AUTO: 5.3 X10E3/UL (ref 3.4–10.8)

## 2021-05-28 ENCOUNTER — OFFICE VISIT (OUTPATIENT)
Dept: FAMILY MEDICINE CLINIC | Facility: CLINIC | Age: 56
End: 2021-05-28

## 2021-05-28 VITALS
OXYGEN SATURATION: 94 % | HEIGHT: 69 IN | TEMPERATURE: 97.7 F | DIASTOLIC BLOOD PRESSURE: 90 MMHG | BODY MASS INDEX: 34.33 KG/M2 | HEART RATE: 114 BPM | WEIGHT: 231.8 LBS | SYSTOLIC BLOOD PRESSURE: 143 MMHG

## 2021-05-28 DIAGNOSIS — I10 ESSENTIAL HYPERTENSION: ICD-10-CM

## 2021-05-28 DIAGNOSIS — J43.1 PANLOBULAR EMPHYSEMA (HCC): ICD-10-CM

## 2021-05-28 DIAGNOSIS — Z00.00 ANNUAL PHYSICAL EXAM: Primary | ICD-10-CM

## 2021-05-28 DIAGNOSIS — J30.2 SEASONAL ALLERGIC RHINITIS, UNSPECIFIED TRIGGER: ICD-10-CM

## 2021-05-28 DIAGNOSIS — F17.200 NICOTINE DEPENDENCE WITH CURRENT USE: ICD-10-CM

## 2021-05-28 DIAGNOSIS — E78.2 MIXED HYPERLIPIDEMIA: ICD-10-CM

## 2021-05-28 PROCEDURE — 99396 PREV VISIT EST AGE 40-64: CPT | Performed by: FAMILY MEDICINE

## 2021-05-28 RX ORDER — FEXOFENADINE HCL 180 MG/1
180 TABLET ORAL DAILY
Qty: 90 TABLET | Refills: 3 | Status: SHIPPED | OUTPATIENT
Start: 2021-05-28 | End: 2022-04-11

## 2021-06-22 ENCOUNTER — TELEPHONE (OUTPATIENT)
Dept: FAMILY MEDICINE CLINIC | Facility: CLINIC | Age: 56
End: 2021-06-22

## 2021-06-22 NOTE — TELEPHONE ENCOUNTER
PATIENT CALLED IN TO REQUEST THAT WE SEND HIS WELLNESS VISIT INFORMATION TO HIS INSURANCE COMPANY BEFORE 7/01/2021 SO HE DOES NOT GET AN INCREASE TO HIS INSURANCE COST     GOOD CALL BACK   [phone]

## 2021-06-22 NOTE — TELEPHONE ENCOUNTER
Spoke with patient and advised that he can come to office to  OV notes from wellness visit to present to work if needed however I can't fax those.  Advised if his place of employment has a form to be filled out by Dr. Arnold then provider would be more than happy to complete the form and our office could fax that form. Patient voiced understanding and stated he would probably get online and just fill out the questionnaire.

## 2021-07-15 DIAGNOSIS — I10 ESSENTIAL HYPERTENSION: ICD-10-CM

## 2021-07-16 RX ORDER — METOPROLOL SUCCINATE 50 MG/1
TABLET, EXTENDED RELEASE ORAL
Qty: 90 TABLET | Refills: 1 | OUTPATIENT
Start: 2021-07-16

## 2021-07-16 RX ORDER — LISINOPRIL 40 MG/1
TABLET ORAL
Qty: 90 TABLET | Refills: 1 | OUTPATIENT
Start: 2021-07-16

## 2022-01-13 DIAGNOSIS — I10 ESSENTIAL HYPERTENSION: ICD-10-CM

## 2022-01-13 RX ORDER — METOPROLOL SUCCINATE 50 MG/1
TABLET, EXTENDED RELEASE ORAL
Qty: 90 TABLET | Refills: 0 | Status: SHIPPED | OUTPATIENT
Start: 2022-01-13 | End: 2022-04-11 | Stop reason: SDUPTHER

## 2022-01-13 RX ORDER — LISINOPRIL 40 MG/1
TABLET ORAL
Qty: 90 TABLET | Refills: 0 | Status: SHIPPED | OUTPATIENT
Start: 2022-01-13 | End: 2022-04-11 | Stop reason: SDUPTHER

## 2022-01-13 NOTE — TELEPHONE ENCOUNTER
Rx Refill Note  Requested Prescriptions     Pending Prescriptions Disp Refills   • metoprolol succinate XL (TOPROL-XL) 50 MG 24 hr tablet [Pharmacy Med Name: METOPROLOL SUCCINATE ER 50MG TB24] 90 tablet 1     Sig: TAKE ONE TABLET BY MOUTH EVERY DAY   • lisinopril (PRINIVIL,ZESTRIL) 40 MG tablet [Pharmacy Med Name: LISINOPRIL 40MG TABS] 90 tablet 1     Sig: TAKE ONE TABLET BY MOUTH EVERY DAY      Last office visit with prescribing clinician: 5/28/2021      Next office visit with prescribing clinician: Visit date not found     Melly Talley MA  01/13/22, 12:20 CST

## 2022-04-11 ENCOUNTER — OFFICE VISIT (OUTPATIENT)
Dept: FAMILY MEDICINE CLINIC | Facility: CLINIC | Age: 57
End: 2022-04-11

## 2022-04-11 VITALS
DIASTOLIC BLOOD PRESSURE: 81 MMHG | TEMPERATURE: 97 F | SYSTOLIC BLOOD PRESSURE: 121 MMHG | HEIGHT: 69 IN | OXYGEN SATURATION: 97 % | WEIGHT: 231.8 LBS | HEART RATE: 111 BPM | BODY MASS INDEX: 34.33 KG/M2

## 2022-04-11 DIAGNOSIS — I10 ESSENTIAL HYPERTENSION: ICD-10-CM

## 2022-04-11 PROCEDURE — 99214 OFFICE O/P EST MOD 30 MIN: CPT | Performed by: NURSE PRACTITIONER

## 2022-04-11 RX ORDER — LISINOPRIL 40 MG/1
40 TABLET ORAL DAILY
Qty: 90 TABLET | Refills: 1 | Status: SHIPPED | OUTPATIENT
Start: 2022-04-11

## 2022-04-11 RX ORDER — METOPROLOL SUCCINATE 50 MG/1
50 TABLET, EXTENDED RELEASE ORAL DAILY
Qty: 90 TABLET | Refills: 1 | Status: SHIPPED | OUTPATIENT
Start: 2022-04-11

## 2022-04-11 NOTE — PROGRESS NOTES
"Chief Complaint   Patient presents with   • Hypertension     Med Review        Subjective   Jeremy Lopez is a 56 y.o. male who presents today for hypertension.    Hypertension  This is a chronic problem. The current episode started more than 1 year ago. The problem is unchanged. The problem is controlled. Risk factors for coronary artery disease include obesity, male gender, smoking/tobacco exposure and sedentary lifestyle. Past treatments include ACE inhibitors and beta blockers. Current antihypertension treatment includes ACE inhibitors and beta blockers. The current treatment provides significant improvement. There are no compliance problems.           No Known Allergies      OBJECTIVE:  Vitals:    04/11/22 1032   BP: 121/81   Pulse: 111   Temp: 97 °F (36.1 °C)   TempSrc: Infrared   SpO2: 97%   Weight: 105 kg (231 lb 12.8 oz)   Height: 175.3 cm (69.02\")     Physical Exam  Vitals and nursing note reviewed.   Constitutional:       Appearance: Normal appearance.   HENT:      Head: Normocephalic and atraumatic.      Nose: Nose normal.      Mouth/Throat:      Mouth: Mucous membranes are moist.   Eyes:      Extraocular Movements: Extraocular movements intact.      Pupils: Pupils are equal, round, and reactive to light.   Cardiovascular:      Rate and Rhythm: Normal rate and regular rhythm.      Pulses: Normal pulses.      Heart sounds: Normal heart sounds.   Pulmonary:      Effort: Pulmonary effort is normal.      Breath sounds: Examination of the right-lower field reveals decreased breath sounds. Examination of the left-lower field reveals decreased breath sounds. Decreased breath sounds present.   Abdominal:      General: Abdomen is flat. Bowel sounds are normal.   Musculoskeletal:         General: Normal range of motion.      Cervical back: Normal range of motion and neck supple.   Skin:     General: Skin is warm.   Neurological:      General: No focal deficit present.      Mental Status: He is alert and oriented " to person, place, and time.   Psychiatric:         Mood and Affect: Mood normal.         Behavior: Behavior normal.         Patient's Body mass index is 34.22 kg/m². indicating that he is obese (BMI >30). Obesity-related health conditions include the following: none. Obesity is unchanged. BMI is is above average; BMI management plan is completed. We discussed low calorie, low carb based diet program, portion control and increasing exercise..        ASSESSMENT/ PLAN:    Diagnoses and all orders for this visit:    1. Essential hypertension  -     lisinopril (PRINIVIL,ZESTRIL) 40 MG tablet; Take 1 tablet by mouth Daily.  Dispense: 90 tablet; Refill: 1  -     metoprolol succinate XL (TOPROL-XL) 50 MG 24 hr tablet; Take 1 tablet by mouth Daily.  Dispense: 90 tablet; Refill: 1          Management Plan:     An After Visit Summary was printed and given to the patient at discharge.    Follow-up: No follow-ups on file.    I spent 30 minutes caring for Jeremy on this date of service. This time includes time spent by me in the following activities: preparing for the visit, obtaining and/or reviewing a separately obtained history, performing a medically appropriate examination and/or evaluation, ordering medications, tests, or procedures and documenting information in the medical record     EMERSON Pacheco 4/11/2022 10:44 CDT  This note was electronically signed.

## 2022-10-20 ENCOUNTER — TELEPHONE (OUTPATIENT)
Dept: FAMILY MEDICINE CLINIC | Facility: CLINIC | Age: 57
End: 2022-10-20